# Patient Record
Sex: MALE
[De-identification: names, ages, dates, MRNs, and addresses within clinical notes are randomized per-mention and may not be internally consistent; named-entity substitution may affect disease eponyms.]

---

## 2021-01-23 NOTE — NUR
DIRECT ADMIT FROM URGENT CARE PER PT, A&OX3, AMBULATORY, DENIES ANY PAIN OR
NAUSEA, PT STATES HE HAD SOME PAIN ON RUQ AREA AND WENT TO THE URGENT CARE
TODAY, STATES HE RECEIVED PAIN MEDICATION AT THE URGENT CARE AND HASN'T HAD
ANY PAIN SINCE, DENIES ANY OTHER SYMPTOMS, DR. BRISCOE NOTIFIED OF CONSULT,
STATES WILL SEE PT IN AM, NPO AFTER MN, OK FOR CLEAR LIQUIDS UNTIL MN, COVID
TEST SENT, PT NOTIFIED OF PLAN, VERBALIZED UNDERSTANDING.

## 2021-01-23 NOTE — NUR
TOLERATED CLEAR LIQUIDS WELL, STILL DENIES ANY NEED FOR PAIN MEDS AT THIS
TIME, DENIES ANY NAUSEA, OOB TO THE BATHROOM TO VOID, NO ACUTE CHANGES THIS
SHIFT.

## 2021-01-24 NOTE — NUR
VSS, REPORTS HAVING SOME INCISIONAL PAIN BUT REFUSES ANY PAIN MEDS AT THIS
TIME, DSG C/D/I, DIANA DRAIN W/ SANGUINOUS DRAINAGE, OOB TO THE BATHROOM TO VOID,
TOLERATING CLEAR LIQUIDS WELL, NO ACUTE CHANGES THIS SHIFT.

## 2021-01-24 NOTE — NUR
ADMIT: 1/23/21 DISCHARGE: DX: cholecystitis CC: [email or web address]
 
[place] CALL:
 
RESIDENCE: home
 
CAREGIVER:self
 
DX: chronic sinusitis
 
DME: none
 
CCM: none
 
HOME HEALTH: none
 
SUMMARY:
1/24/21- per chart review with Dr. Pisano, pt is having/had surgery this
morning. She states that if the pt is doing well s/p surgery, he could
potentially be d/c today. Pt does have an upcoming appt with Dr. Melgar on
1/26/21 at 800. -kjw
1: Cholelithiasis with acute cholecystitis with biliary obstruction
A/P: IVF, IVabx, NPO and to the OR for a laparoscopic, possible open,
cholecystectomy with cholangiogram. PARQ and consent complete. Gallbladder
handbook reviewed in detail.

## 2021-01-24 NOTE — NUR
PT VSS T/O NIGHT. PT REP PAIN VALERIY, DECLINED NEED FOR PAIN MEDS, NO C/O N/V. PT
IS VOIDING URINE W/O DIFFICULTY; URINE CLEAR YELLOW. PT NPO POST MIDNIGHT FOR
PLAN FOR SURGERY TODAY.

## 2021-01-25 NOTE — NUR
POD 1 S/P LAP LYNN. PT VSS T/O NIGHT. DRESSINGS CDI, DIANA PUT OUT 70ML TOTAL OF
SS FLUID. PT REP PAIN MINIMAL, TORADOL GIVEN X1 W/REP RELIEF. PT DENIED N/V,
REP +SMALL AMT FLATUS. PT VALERIY CL PO, HAS BEEN NPO X FEW SIPS OF WATER POST
MIDNIGHT FOR POSS TRANSFER FOR ERCP TODAY.

## 2021-01-25 NOTE — NUR
Advance Directive(AD) education attempted/ Spiritual Care visit conducted.
After receiving an admit referral for AD education, I visit patient. Patient
expresses no interest in filling out an Advance Directive and does not want to
discuss it.
Patient did however enjoy discussing his role as the  of the [employer] in [place]. 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
covered while he came into the hospital on a Saturday. He says that he has
great support from his Sabianist and his daughters. I provide therapeutic
listening and prayer. Patient voices appreciation for the prayer.

## 2021-01-25 NOTE — NUR
REPORT CALLED TO [facility] AS [facility]
PT'S PAIN WELL CONTROLLED. PASSING GAS. EATING & DRINKING FULL LQS W/O ISSUE.

## 2022-03-16 NOTE — NUR
2050- PATIENT COMPLAINING OF R ARM PAIN, NAUSEA, AND DIAPHORESIS. IV POTASSIUM
STOPPED. REQUESTED IV ZOFRAN FROM CHARGE RN. PATIENT BEGAIN TO LOOSE
CONSCIOUSNESS AND VOMIT. CRASH CART BROUGHT TO BEDSIDE. DR. CARDONA AND PAM
AT BEDSIDE.
PATIENT HAD A PULSE
WITH HR IN 60S. 30 SECONDS LATER PATIENT REGAINED CONSCIOUSNESS AND WAS
A&OX4.STAT EKG WITH NO CHANGES. . RESTARTED IV POTASSIUM IN LEFT ARM
AND PATIENT QUICKLY DEVELOPED NAUSEA AND DIAPHORESIS. IV POTASSIUM STOPPED.
DR. OROZCO NOTIFIED AND DISCUSSED CHANGING TO PO POTASSIUM ONCE NAUSEA
RESOLVED. WILL REPEAT AM LABS AND CONTINUE TO MONITOR OVERNIGHT.

## 2022-03-16 NOTE — NUR
ASSUMED CARE OF PATIENT 1715 POST CATH LAB POST STEND LAD : AOX4, FOLLOWS
COMMANDS, CALM/COOPERATIVE, DENIES SOB, TITRATED DOWN FROM NRB 10L TO 2L NC,
LUNGS CLEAR, NSR 80S, +2 PUSLES, EKG OBTAINED, TROPONIN CRITICAL MD AWARE
EXPECTED, HTN LISINOPRIL GIVEN, PRN HYDRALAZINE ORDER, R RADIAL TR BAND 2ML
REMOVED 9ML LEFT CDI NO BLEEDING, FREQUENT URINAL USE POST 40MG LASIX CLEAR
YELLOW, K 2.9, 40MEQ/IV ORDERED, PATIENT RECEIVED 324MG ASPIRIN IN FIELD,
REQUIRES 600MG LOADING PLAVIX DOSE, PAM ATKINSON CONSULTED HOSPITALIST, REPORT
GIVEN TO NIGHT RN.

## 2022-03-17 NOTE — NUR
ASSUMED CARE OF PATIENT 0700: AOX4, FOLLOWS COMMANDS, NSR 60/70S, QTC
PROLONGED GIVEN PO METOPROLOL EARLY, MAG 2.5, HOLD ZOFRAN, DENIES NAUSEA, 3/10
MUSCULOSKELETAL RIB PAIN WITH BREATHING, 2L NC REMOVED SATS 94, DENIES SOB,
LUNGS CLEAR, IO ACCESS REMOVED CDI DRESSING, R RADIAL SITE CDI NO HEMATOMA,
WILL CONTINUE TO MONITOR.

## 2022-03-17 NOTE — NUR
AOX4, FOLLOWS COMMANDS, DENIES PAIN EXCEPT MUSCULOSKELETAL 3/10 W/ DEEP
BREATHS, DOWN TO 1L NC, LUNGS CLEAR, DENIES SOB, SOME DESAT 91 SPO2 WHEN
SLEEPING ON RA ATTEMPT, NSR 70S, BP WNL, PROLONGED QT .54, DC ZOFRAN,
METOPROLOL PO EARLY, MAG LEVEL 2.6, QT IMPROVED OVER SHIFT, TROPONIN TRENDING
DOWN, TOLERATING DIET, IO ACCESS REMOVED CDI DRESSING, R RADIAL ACCESS CDI NO
HEMATOMA, MADE PCU STATUS, EXPECTED DISCHARGE TOMORROW, WILL REPORT TO NIGHT
RN.

## 2022-03-17 NOTE — NUR
Pt. is alert and welcomes my visit.  Pt. is a man of ger[place], actually a local
.  No apparent issues of distress. Listen emptathetically and
establish rapport. Pt.  displays evidence of understanding the miracle of his
health crisis and the fantastic work of the the EMTs who revived him. Prayed
for pt. Pt. verbalized his gratitude for the spiritual care he received.

## 2022-03-17 NOTE — NUR
ASSUMED CARE
PATIENT AXOX4, DENIES PAIN EXCEPT MUSCULOSKELETAL STERNAL/RIB PAIN WITH DEEP
INSPIRATION AND COUGHING. NSR 70S, SBP 100S, 2+ PULSES, RIGHT RADIAL PCI SITE
C/D/I, NO HEMOTOMA. SPO2 88-89 ON 1L NC SO INCREASED TO 2L NC. CLEAR/DIM LUNG
SOUNDS. ENCOURAGING DEEP BREATHING WITH IS. TOLERATING DIET, NO COMPLAINTS OF
NAUSEA. VOIDING PER URINAL. WILL CONTINUE TO MONITOR.

## 2022-03-17 NOTE — NUR
SHIFT SUMMARY
AFTER SYNCOPAL EPISODE, PATIENT SLEPT THROUGHOUT THE NIGHT WITHOUT COMPLAINTS
OF NAUSEA OR INCREASING CHEST PAIN. MUSCULOSKELETAL CHEST PAIN WITH COUGHING.
PATIENT AROUSABLE, A&OX4. AFEBRILE. NSR 60S-80S, MINIMAL ECTOPY. 20 MEQ
POTASSIUM PO INCREASED SERUM POTASSIUM TO 4.5. SBP 90-100S WITH MAP>65. +2
PULSES. TR BAND OFF @ 2130. DRESSING C/D/I. NO HEMATOMA. 2L NC. SPO2>94%.
TOLERATING PO INTAKE AFTER ZOFRAN. VOIDING PER URINAL. TROPONIN 20,377- HAS
NOT PEAKED. WILL CONTINUE TO MONITOR AND REPORT OFF TO DAY SHIFT WHEN
AVAILABLE.

## 2022-03-18 NOTE — NUR
ENCOURAGED DEEP BREATHING, COUGHING, IS, EDUCATED ON HOW TO BRACE AGAINST A
PILLOW, PATIENT HAS RIB PATIENT AND IS NOT BREATHING DEEP, PATIENT EDUCATED ON
PNEUMONIA, USING IS NOW, IS , WILL CONTINUE TO USE

## 2022-03-18 NOTE — NUR
DR HEWITT ROUNDED, ECHO DONE, WAITING FOR DR NORWOOD TO READ, LISINOPRIL
CHANGED TO IMUDUR, RELAYED TO DR HEWITT HELD AM METOPROLOL FOR SBP 90S, PATIENT
DENIES PAIN PRESENTLY, WCTM

## 2022-03-18 NOTE — NUR
NO MAJOR OVERNIGHT EVENTS. AXOX4. NO CHANGES TO CHEST PAIN WITH DEEP
INSPIRATION OR COUGHING. ENCOURAGING USE OF INCENTIVE SPIROMETER. 1L NC VS RA.
SPO2 90-95%. DENIES SOB. NSR 60-80S. SBP 90-1TEENS. RIGHT RADIAL DRESSING
C/D/I. TOLERATING PO, NO NAUSEA. WILL POTENTIALLY D/C HOME TODAY.

## 2022-03-18 NOTE — NUR
SHIFT SUMMARY
 
PT WAS TRANSFERRED TO THE FLOOR [facility] 1630. ALL BELONGINGS BROUGHT WITH BY ICU
AND PT REMAINED HOOKED UP TO TELE UNTIL ANOTHER TELE BOX COULD BE BROUGHT. HE
WAS ABLE TO GET FROM THE CHAIR TO THE BED WITHOUT HELP. PT COMPLAINING OF
SOURE CHEST AND THROAT. CEPACOL LOZENGES ORDERED FOR HIM, WAITING ON PHARMACY
TO APPROVE. WILL PASS ONTO NIGHT SHIFT IF NOT APPROVED BEFORE SHIFT OVER. PT
IS RESTING COMFORTABLE IN BED, VITAL SIGNS STABLE, DENIES PAIN OR SOB. VERY
EAGER TO BE DISCHARGED TOMORROW AS IS THE  HOPE AS WELL. WILL CONTINUE TO
MONITOR.

## 2022-03-18 NOTE — NUR
ASSUMED CARE, REPORT FROM PM RN, ECHO IN ROOM NOW WITH PATIENT, UNEVENTFUL
NIGHT, CALL LIGHT WITH IN REACH, POSSIBLE D/C TODAY, WCTM

## 2022-03-18 NOTE — NUR
DR HEWITT CALLED, PATIENT UPDATE, DR HEWITT TO CALL [facility] TO SEE IF PATIENT
SHOULD STAY ANOTHER NIGHT OR GO HOME, REPORTED BP AND QTC, PATIENT RESTING IN
BED, CALL LIGHT WITH IN REACH, [facility]

## 2022-03-18 NOTE — NUR
TRANSFER OF CARE REPORT TO [place] ON MEDICAL FLOOR, PATIENT TO BE TRANSFERED
TO [facility]

## 2022-03-19 NOTE — NUR
SHIFT SUMMARY
PT A/O X4, SR ON TELEMETRY WITH HR IN THE 80'S PER MONITOR TECH, DENIED ANY CP
OR SOB THIS SHIFT, USED IS UP  WITH ENCOURAGEMENT AND INSTRUCTED TO USE
10X Q2 HRS AND TO T,C AND DB Q 2 HOURS AND PT VERBALIZED UNDERSTANDING,
AMBULATED TO BR AND GENERALIZED WEAKNESS NOTED , RT. RADIAL CATH SITE DRSG WAS
REMOVED AND PUNCTURE SITE IS INTACT WITH GOOD CSM TO  RT. HAND AND GOOD RADIAL
PULSES. NO ACUTE DISTRESS NOTED.

## 2022-03-19 NOTE — NUR
DISCHARGE
 
PT DISCHARGED HOME BY WHEELCHAIR AT 1320. ALL DISCHARGE PAPERWORK GONE OVER
AND IV DC'D. PT GIVEN NEW PT PACKETS AND INFORMED ON HOW TO MAKE AN
APPOINTMENT WITH A NEW DOCTOR.

## 2023-10-08 ENCOUNTER — HOSPITAL ENCOUNTER (EMERGENCY)
Dept: HOSPITAL 95 - ER | Age: 63
Discharge: HOME | End: 2023-10-08
Payer: MEDICARE

## 2023-10-08 VITALS — BODY MASS INDEX: 28 KG/M2 | HEIGHT: 71 IN | WEIGHT: 200 LBS

## 2023-10-08 VITALS — DIASTOLIC BLOOD PRESSURE: 96 MMHG | SYSTOLIC BLOOD PRESSURE: 152 MMHG

## 2023-10-08 DIAGNOSIS — Z79.899: ICD-10-CM

## 2023-10-08 DIAGNOSIS — R06.02: Primary | ICD-10-CM

## 2023-10-08 DIAGNOSIS — Z79.82: ICD-10-CM

## 2023-10-08 LAB
ALBUMIN SERPL BCP-MCNC: 3.8 G/DL (ref 3.4–5)
ALBUMIN/GLOB SERPL: 1.1 {RATIO} (ref 0.8–1.8)
ALT SERPL W P-5'-P-CCNC: 31 U/L (ref 12–78)
ANION GAP SERPL CALCULATED.4IONS-SCNC: 4 MMOL/L (ref 6–16)
AST SERPL W P-5'-P-CCNC: 23 U/L (ref 12–37)
BASOPHILS # BLD AUTO: 0.08 K/MM3 (ref 0–0.23)
BASOPHILS NFR BLD AUTO: 1 % (ref 0–2)
BILIRUB SERPL-MCNC: 1.5 MG/DL (ref 0.1–1)
BUN SERPL-MCNC: 12 MG/DL (ref 8–24)
CALCIUM SERPL-MCNC: 8.6 MG/DL (ref 8.5–10.1)
CHLORIDE SERPL-SCNC: 111 MMOL/L (ref 98–108)
CO2 SERPL-SCNC: 26 MMOL/L (ref 21–32)
CREAT SERPL-MCNC: 1.02 MG/DL (ref 0.6–1.2)
DEPRECATED RDW RBC AUTO: 39.7 FL (ref 35.1–46.3)
EOSINOPHIL # BLD AUTO: 0.47 K/MM3 (ref 0–0.68)
EOSINOPHIL NFR BLD AUTO: 6 % (ref 0–6)
ERYTHROCYTE [DISTWIDTH] IN BLOOD BY AUTOMATED COUNT: 12.1 % (ref 11.7–14.2)
GLOBULIN SER CALC-MCNC: 3.6 G/DL (ref 2.2–4)
GLUCOSE SERPL-MCNC: 115 MG/DL (ref 70–99)
HCT VFR BLD AUTO: 49 % (ref 37–53)
HGB BLD-MCNC: 17 G/DL (ref 13.5–17.5)
IMM GRANULOCYTES # BLD AUTO: 0.03 K/MM3 (ref 0–0.1)
IMM GRANULOCYTES NFR BLD AUTO: 0 % (ref 0–1)
LYMPHOCYTES # BLD AUTO: 2.99 K/MM3 (ref 0.84–5.2)
LYMPHOCYTES NFR BLD AUTO: 36 % (ref 21–46)
MCHC RBC AUTO-ENTMCNC: 34.7 G/DL (ref 31.5–36.5)
MCV RBC AUTO: 91 FL (ref 80–100)
MONOCYTES # BLD AUTO: 0.61 K/MM3 (ref 0.16–1.47)
MONOCYTES NFR BLD AUTO: 7 % (ref 4–13)
NEUTROPHILS # BLD AUTO: 4.18 K/MM3 (ref 1.96–9.15)
NEUTROPHILS NFR BLD AUTO: 50 % (ref 41–73)
NRBC # BLD AUTO: 0 K/MM3 (ref 0–0.02)
NRBC BLD AUTO-RTO: 0 /100 WBC (ref 0–0.2)
PLATELET # BLD AUTO: 213 K/MM3 (ref 150–400)
POTASSIUM SERPL-SCNC: 3.9 MMOL/L (ref 3.5–5.5)
PROT SERPL-MCNC: 7.4 G/DL (ref 6.4–8.2)
SODIUM SERPL-SCNC: 141 MMOL/L (ref 136–145)

## 2023-10-08 PROCEDURE — A9270 NON-COVERED ITEM OR SERVICE: HCPCS

## 2023-10-30 NOTE — NUR
ASSUMED CARE
 
PATIENT ALERT/A&OX4/ COMPLAINS OF MUSCULOSKELETAL PAIN FROM CPR, DENIES OTHER
CHEST PAIN OR SOB. HR 80S-90S NSR, SBP 140S-150S. NC 2L. VOIDING PER URINAL.
TOLERATING PO INTAKE. TR BAND WITH 9CC AIR, C/D/I. WILL CONTINUE TO RELEASE
AIR AND MONITOR FOR BLEEDING. Body Location Override (Optional - Billing Will Still Be Based On Selected Body Map Location If Applicable): right helix Detail Level: Detailed Size Of Lesion In Cm (Optional): 0 Body Location Override (Optional - Billing Will Still Be Based On Selected Body Map Location If Applicable): left cheek Body Location Override (Optional - Billing Will Still Be Based On Selected Body Map Location If Applicable): left upper back